# Patient Record
Sex: FEMALE | URBAN - METROPOLITAN AREA
[De-identification: names, ages, dates, MRNs, and addresses within clinical notes are randomized per-mention and may not be internally consistent; named-entity substitution may affect disease eponyms.]

---

## 2023-08-31 ENCOUNTER — APPOINTMENT (OUTPATIENT)
Dept: URBAN - METROPOLITAN AREA CLINIC 247 | Age: 38
Setting detail: DERMATOLOGY
End: 2023-08-31

## 2023-08-31 DIAGNOSIS — D49.2 NEOPLASM OF UNSPECIFIED BEHAVIOR OF BONE, SOFT TISSUE, AND SKIN: ICD-10-CM

## 2023-08-31 DIAGNOSIS — D18.0 HEMANGIOMA: ICD-10-CM

## 2023-08-31 DIAGNOSIS — L81.4 OTHER MELANIN HYPERPIGMENTATION: ICD-10-CM

## 2023-08-31 DIAGNOSIS — D22 MELANOCYTIC NEVI: ICD-10-CM

## 2023-08-31 PROBLEM — D18.01 HEMANGIOMA OF SKIN AND SUBCUTANEOUS TISSUE: Status: ACTIVE | Noted: 2023-08-31

## 2023-08-31 PROBLEM — D22.5 MELANOCYTIC NEVI OF TRUNK: Status: ACTIVE | Noted: 2023-08-31

## 2023-08-31 PROCEDURE — OTHER MIPS QUALITY: OTHER

## 2023-08-31 PROCEDURE — 11300 SHAVE SKIN LESION 0.5 CM/<: CPT

## 2023-08-31 PROCEDURE — 11310 SHAVE SKIN LESION 0.5 CM/<: CPT

## 2023-08-31 PROCEDURE — OTHER SHAVE REMOVAL: OTHER

## 2023-08-31 PROCEDURE — OTHER COUNSELING: OTHER

## 2023-08-31 PROCEDURE — 99203 OFFICE O/P NEW LOW 30 MIN: CPT | Mod: 25

## 2023-08-31 ASSESSMENT — LOCATION DETAILED DESCRIPTION DERM
LOCATION DETAILED: RIGHT SUPERIOR PREAURICULAR CHEEK
LOCATION DETAILED: RIGHT INFERIOR UPPER BACK
LOCATION DETAILED: LEFT SUPERIOR UPPER BACK
LOCATION DETAILED: RIGHT MID-UPPER BACK

## 2023-08-31 ASSESSMENT — LOCATION SIMPLE DESCRIPTION DERM
LOCATION SIMPLE: RIGHT CHEEK
LOCATION SIMPLE: RIGHT UPPER BACK
LOCATION SIMPLE: LEFT UPPER BACK

## 2023-08-31 ASSESSMENT — LOCATION ZONE DERM
LOCATION ZONE: TRUNK
LOCATION ZONE: FACE

## 2023-08-31 NOTE — PROCEDURE: SHAVE REMOVAL
Lab Facility: 0
Anesthesia Volume In Cc: 0.5
Detail Level: Detailed
Post-Care Instructions: Wash gently daily with soap and water. Apply Vaseline and cover with a bandage until healed. Avoid standing water, including swimming pools, hot tubs and lakes until healed to avoid increased risk of infection.
Consent was obtained from the patient. Risks include infection, scarring, bleeding, prolonged wound healing, incomplete removal, allergy to anesthesia, nerve injury and recurrence.
Anesthesia Type: 1% lidocaine with epinephrine
Add Variable For Additional Medical Justification: No
Path Notes (To The Dermatopathologist): Check margins
Hemostasis: Electrocautery
Depth Of Shave: dermis
Billing Type: Third-Party Bill
Medical Necessity Clause: This procedure was medically necessary because the lesion that was treated was:
Medical Necessity Information: It is in your best interest to select a reason for this procedure from the list below. All of these items fulfill various CMS LCD requirements except the new and changing color options.
Notification Instructions: Patient will be notified of pathology results; however, patient should call if no  results are delivered within 2 weeks.
Wound Care: Petrolatum
Biopsy Method: Dermablade
Lab: [id number]
Was A Bandage Applied: Yes
Size Of Lesion In Cm (Required): 0.4
Hemostasis: Drysol